# Patient Record
Sex: MALE | Race: WHITE | Employment: UNEMPLOYED | URBAN - METROPOLITAN AREA
[De-identification: names, ages, dates, MRNs, and addresses within clinical notes are randomized per-mention and may not be internally consistent; named-entity substitution may affect disease eponyms.]

---

## 2023-04-07 ENCOUNTER — HOSPITAL ENCOUNTER (EMERGENCY)
Age: 38
Discharge: HOME | End: 2023-04-07
Attending: EMERGENCY MEDICINE

## 2023-04-07 ENCOUNTER — APPOINTMENT (OUTPATIENT)
Dept: GENERAL RADIOLOGY | Age: 38
End: 2023-04-07

## 2023-04-07 VITALS
RESPIRATION RATE: 19 BRPM | SYSTOLIC BLOOD PRESSURE: 129 MMHG | DIASTOLIC BLOOD PRESSURE: 79 MMHG | TEMPERATURE: 98.1 F | HEIGHT: 70 IN | HEART RATE: 63 BPM | BODY MASS INDEX: 41.52 KG/M2 | OXYGEN SATURATION: 97 % | WEIGHT: 290 LBS

## 2023-04-07 DIAGNOSIS — R07.9 CHEST PAIN, UNSPECIFIED TYPE: Primary | ICD-10-CM

## 2023-04-07 LAB
ALBUMIN SERPL-MCNC: 4.3 G/DL (ref 3.5–5)
ALBUMIN/GLOB SERPL: 1.2 (ref 0.4–1.6)
ALP SERPL-CCNC: 122 U/L (ref 45–117)
ALT SERPL-CCNC: 23 U/L (ref 13–61)
ANION GAP SERPL CALC-SCNC: 9 MMOL/L (ref 2–11)
AST SERPL-CCNC: 20 U/L (ref 15–37)
BILIRUB SERPL-MCNC: 0.7 MG/DL (ref 0.2–1.1)
BUN SERPL-MCNC: 14 MG/DL (ref 7–18)
CALCIUM SERPL-MCNC: 9.5 MG/DL (ref 8.3–10.4)
CHLORIDE SERPL-SCNC: 102 MMOL/L (ref 98–107)
CO2 SERPL-SCNC: 27 MMOL/L (ref 21–32)
CREAT SERPL-MCNC: 0.75 MG/DL (ref 0.8–1.5)
ERYTHROCYTE [DISTWIDTH] IN BLOOD BY AUTOMATED COUNT: 14.1 % (ref 11.9–14.6)
GLOBULIN SER CALC-MCNC: 3.7 G/DL (ref 2.8–4.5)
GLUCOSE SERPL-MCNC: 95 MG/DL (ref 65–100)
HCT VFR BLD AUTO: 46.6 % (ref 41.1–50.3)
HGB BLD-MCNC: 15.2 G/DL (ref 13.6–17.2)
MCH RBC QN AUTO: 28 PG (ref 26.1–32.9)
MCHC RBC AUTO-ENTMCNC: 32.6 G/DL (ref 31.4–35)
MCV RBC AUTO: 85.8 FL (ref 82–102)
NRBC # BLD: 0 K/UL (ref 0–0.2)
NT PRO BNP: <36 PG/ML (ref 0–450)
PLATELET # BLD AUTO: 296 K/UL (ref 150–450)
PMV BLD AUTO: 9.4 FL (ref 9.4–12.3)
POTASSIUM SERPL-SCNC: 4.2 MMOL/L (ref 3.5–5.1)
PROT SERPL-MCNC: 8 G/DL (ref 6.4–8.2)
RBC # BLD AUTO: 5.43 M/UL (ref 4.23–5.6)
SODIUM SERPL-SCNC: 138 MMOL/L (ref 133–143)
TROPONIN T SERPL HS-MCNC: 12 NG/L (ref 0–22)
TROPONIN T SERPL HS-MCNC: 7.9 NG/L (ref 0–22)
WBC # BLD AUTO: 12.3 K/UL (ref 4.3–11.1)

## 2023-04-07 PROCEDURE — 80053 COMPREHEN METABOLIC PANEL: CPT

## 2023-04-07 PROCEDURE — 71045 X-RAY EXAM CHEST 1 VIEW: CPT

## 2023-04-07 PROCEDURE — 83880 ASSAY OF NATRIURETIC PEPTIDE: CPT

## 2023-04-07 PROCEDURE — 85027 COMPLETE CBC AUTOMATED: CPT

## 2023-04-07 PROCEDURE — 93005 ELECTROCARDIOGRAM TRACING: CPT | Performed by: EMERGENCY MEDICINE

## 2023-04-07 PROCEDURE — 84484 ASSAY OF TROPONIN QUANT: CPT

## 2023-04-07 ASSESSMENT — PAIN - FUNCTIONAL ASSESSMENT
PAIN_FUNCTIONAL_ASSESSMENT: NONE - DENIES PAIN

## 2023-04-07 ASSESSMENT — LIFESTYLE VARIABLES
HOW MANY STANDARD DRINKS CONTAINING ALCOHOL DO YOU HAVE ON A TYPICAL DAY: PATIENT DOES NOT DRINK
HOW OFTEN DO YOU HAVE A DRINK CONTAINING ALCOHOL: NEVER

## 2023-04-07 ASSESSMENT — ENCOUNTER SYMPTOMS
BACK PAIN: 0
GASTROINTESTINAL NEGATIVE: 1
RESPIRATORY NEGATIVE: 1

## 2023-04-07 NOTE — ED TRIAGE NOTES
Patient ambulatory into ED. Patient states last night around 10pm chest pain, denies sob, patient covered in sweat, almost blacked out. Pain did not last long. Patient states numb in L arm, and he feels tired today.
0

## 2023-04-07 NOTE — ED NOTES
I have reviewed discharge instructions with the patient. The patient verbalized understanding. Patient left ED via Discharge Method: ambulatory to Home with patient sister. Opportunity for questions and clarification provided. Patient given 0 scripts. To continue your aftercare when you leave the hospital, you may receive an automated call from our care team to check in on how you are doing. This is a free service and part of our promise to provide the best care and service to meet your aftercare needs.  If you have questions, or wish to unsubscribe from this service please call [phone]. Thank you for Choosing our [facility] Emergency Department.         Juanito Mcmahan RN  04/07/23 [id number]

## 2023-04-07 NOTE — ED PROVIDER NOTES
Emergency Department Provider Note       PCP: None None   Age: 42 y.o. Sex: male     DISPOSITION Decision To Discharge 04/07/2023 03:49:26 PM       ICD-10-CM    1. Chest pain, unspecified type  R07.9 [facility]          Medical Decision Making     Complexity of Problems Addressed:  1 or more acute illnesses that pose a threat to life or bodily function. Data Reviewed and Analyzed:  Category 1:   I independently ordered and reviewed each unique test.      Category 2:   I independently ordered and interpreted the ED EKG in the absence of a Cardiologist.    See below  I interpreted the X-rays chest x-ray without consolidation or infiltrate. .  I interpreted the labs. Category 3: Discussion of management or test interpretation. EKG without any acute STEMI or ischemic changes. Will initiate cardiac work-up, chest x-ray. Low suspicion for pneumonia, pneumothorax, dissection at this time    3:52 PM  Serial troponins are negative. BNP is normal.  Chest x-ray with mildly enlarged cardiac shadow. Lungs otherwise clear. His blood pressure has been relatively normal.  I would not start him on blood pressure medication. I will give a referral for cardiology follow-up with chest pain. I have a low suspicion for acute ACS at this time. He is stable for discharge. Return precautions given. Dietary changes information given. He has no further questions or concerns    Risk of Complications and/or Morbidity of Patient Management:  Shared medical decision making was utilized in creating the patients health plan today. ED Course as of 04/07/23 1552   Fri Apr 07, 2023   1351 EKG obtained from triage interpreted by me. Appears sinus in nature. Rate of 73. Wandering baseline noted. No STEMI criteria noted    EKG #2 was obtained when patient went back to the room. Interpreted by me. Rate of 69. Sinus rhythm with normal axis and interval.  No STEMI or ischemic changes noted.  [DT]

## 2023-04-07 NOTE — DISCHARGE INSTRUCTIONS
We would love to help you get a primary care doctor for follow-up after your emergency department visit. Please call [phone] between 7AM - 6PM Monday to Friday. A care navigator will be able to assist you with setting up a doctor close to your home.

## 2023-04-08 LAB
EKG ATRIAL RATE: 73 BPM
EKG DIAGNOSIS: NORMAL
EKG Q-T INTERVAL: 369 MS
EKG QRS DURATION: 99 MS
EKG QTC CALCULATION (BAZETT): 407 MS
EKG R AXIS: 42 DEGREES
EKG T AXIS: 63 DEGREES
EKG VENTRICULAR RATE: 73 BPM

## 2023-05-09 NOTE — PROGRESS NOTES
[facility] CARDIOLOGY History & Physical                 Reason for Visit: Chest pain    Subjective:     Patient is a 42 y.o. male who presents as a referral for chest pain. The patient visited the ED on April 7 with chest pain. Troponin was negative x2. BNP was undetectably low. The patient had an ECG on April 8 that was noted to demonstrate sinus rhythm with a DE interval of 200 ms (machine read the EKG wrong as atrial fibrillation). The patient reports having a chest pain occasionally that is left-sided. He denies hemoptysis. The patient does not report any alleviating or aggravating factors for the chest pain when it occurs. He reports that he feels SOB when \"I go into panic mode\". No past medical history on file. No past surgical history on file. No family history on file. Social History     Tobacco Use    Smoking status: Former     Types: Cigarettes    Smokeless tobacco: Current   Substance Use Topics    Alcohol use: Not on file      No Known Allergies      ROS:  No obvious pertinent positives on review of systems except for what was outlined above.        Objective:       /86   Pulse 68   Ht 5' 10\" (1.778 m)   Wt (!) 324 lb 3.2 oz (147.1 kg)   BMI 46.52 kg/m²     BP Readings from Last 3 Encounters:   05/11/23 134/86   04/07/23 129/79       Wt Readings from Last 3 Encounters:   05/11/23 (!) 324 lb 3.2 oz (147.1 kg)   04/07/23 290 lb (131.5 kg)       General/Constitutional:   Alert and oriented x 3, no acute distress  HEENT:   normocephalic, atraumatic, moist mucous membranes  Neck:   No JVD or carotid bruits bilaterally  Cardiovascular:   regular rate and rhythm, no rub/gallop appreciated  Pulmonary:   clear to auscultation bilaterally, no respiratory distress  Abdomen:   soft, non-tender, non-distended  Ext:   No sig LE edema bilaterally  Skin:  warm and dry, no obvious rashes seen  Neuro:   no obvious sensory or motor deficits  Psychiatric:   normal mood and affect    Data Review:   No

## 2023-05-11 ENCOUNTER — INITIAL CONSULT (OUTPATIENT)
Age: 38
End: 2023-05-11

## 2023-05-11 VITALS
WEIGHT: 315 LBS | HEART RATE: 68 BPM | DIASTOLIC BLOOD PRESSURE: 86 MMHG | SYSTOLIC BLOOD PRESSURE: 134 MMHG | HEIGHT: 70 IN | BODY MASS INDEX: 45.1 KG/M2

## 2023-05-11 DIAGNOSIS — R06.02 SOB (SHORTNESS OF BREATH): ICD-10-CM

## 2023-05-11 DIAGNOSIS — R07.89 ATYPICAL CHEST PAIN: ICD-10-CM

## 2023-05-11 DIAGNOSIS — Z00.00 HEALTHCARE MAINTENANCE: Primary | ICD-10-CM

## 2023-06-10 PROBLEM — Z00.00 HEALTHCARE MAINTENANCE: Status: RESOLVED | Noted: 2023-05-11 | Resolved: 2023-06-10
